# Patient Record
(demographics unavailable — no encounter records)

---

## 2025-07-23 NOTE — PHYSICAL EXAM

## 2025-07-23 NOTE — PHYSICAL EXAM

## 2025-07-23 NOTE — REVIEW OF SYSTEMS
[Joint Pain] : joint pain [Negative] : Heme/Lymph [FreeTextEntry9] : left groin pain with certain mvmts radiating to knee- see hPI- better with PT

## 2025-07-23 NOTE — PHYSICAL EXAM

## 2025-07-23 NOTE — HISTORY OF PRESENT ILLNESS
[de-identified] : Lat seen Preop in Jan- had Left Hip replacement- all went well and very happy with result- no pain and good mobility- had f/u and PT  Patient has been generally well . Adherent with medications as noted without side effects. Appetite good and weight reportedly stable. Active with good exercise tolerance. No sx of chest pain, sob, palpitations, orthopnea, PND, JALLOH, edema, lightheadedness..

## 2025-07-23 NOTE — HEALTH RISK ASSESSMENT
[Monthly or less (1 pt)] : Monthly or less (1 point) [1 or 2 (0 pts)] : 1 or 2 (0 points) [Never (0 pts)] : Never (0 points) [No] : In the past 12 months have you used drugs other than those required for medical reasons? No [No falls in past year] : Patient reported no falls in the past year [0] : 2) Feeling down, depressed, or hopeless: Not at all (0) [PHQ-2 Negative - No further assessment needed] : PHQ-2 Negative - No further assessment needed [Never] : Never [Excellent] : ~his/her~ current health as excellent [Very Good] : ~his/her~  mood as very good [Yes] : takes [None] : Patient does not have any barriers to medication adherence [Benzodiazepines] : benzodiazepines [Opioids] : opioids [Blood Thinners] : blood thinners [Muscle Relaxants] : muscle relaxants [Sedatives] : sedatives [NO] : No [Alone] : lives alone [Significant Other] : lives with significant other [Sexually Active] : sexually active [Feels Safe at Home] : Feels safe at home [Fully functional (bathing, dressing, toileting, transferring, walking, feeding)] : Fully functional (bathing, dressing, toileting, transferring, walking, feeding) [Fully functional (using the telephone, shopping, preparing meals, housekeeping, doing laundry, using] : Fully functional and needs no help or supervision to perform IADLs (using the telephone, shopping, preparing meals, housekeeping, doing laundry, using transportation, managing medications and managing finances) [Reports normal functional visual acuity (ie: able to read med bottle)] : Reports normal functional visual acuity [Reports changes in dental health] : Reports changes in dental health [Smoke Detector] : smoke detector [Seat Belt] :  uses seat belt [With Patient/Caregiver] : , with patient/caregiver [Designated Healthcare Proxy] : Designated healthcare proxy [Name: ___] : Health Care Proxy's Name: [unfilled]  [I will adhere to the patient's wishes.] : I will adhere to the patient's wishes. [FreeTextEntry1] : None [de-identified] : HOsp in Jan for left total hip  [de-identified] : PT; Ortho; Optho [Audit-CScore] : 1 [de-identified] : walking- b swimming at gym [de-identified] : eats everything- a lot of fruits and veggies- low fat- low calorie [de-identified] : Last fall 9/18 [[facility]es[facility]] : 8 [[id number]] : 0 [Change in mental status noted] : No change in mental status noted [Language] : denies difficulty with language [Behavior] : denies difficulty with behavior [Learning/Retaining New Information] : denies difficulty learning/retaining new information [Handling Complex Tasks] : denies difficulty handling complex tasks [Spatial Ability and Orientation] : denies difficulty with spatial ability and orientation [High Risk Behavior] : no high risk behavior [Reports changes in hearing] : Reports no changes in hearing [Reports changes in vision] : Reports no changes in vision [FreeTextEntry3] : Boyfriend for 8-9 years [de-identified] : [facility] doctor yearly- Dr Servin [de-identified] : NO issues driving [AdvancecareDate] : 07/25

## 2025-07-23 NOTE — HEALTH RISK ASSESSMENT
[Monthly or less (1 pt)] : Monthly or less (1 point) [1 or 2 (0 pts)] : 1 or 2 (0 points) [Never (0 pts)] : Never (0 points) [No] : In the past 12 months have you used drugs other than those required for medical reasons? No [No falls in past year] : Patient reported no falls in the past year [0] : 2) Feeling down, depressed, or hopeless: Not at all (0) [PHQ-2 Negative - No further assessment needed] : PHQ-2 Negative - No further assessment needed [Never] : Never [Excellent] : ~his/her~ current health as excellent [Very Good] : ~his/her~  mood as very good [Yes] : takes [None] : Patient does not have any barriers to medication adherence [Benzodiazepines] : benzodiazepines [Opioids] : opioids [Blood Thinners] : blood thinners [Muscle Relaxants] : muscle relaxants [Sedatives] : sedatives [NO] : No [Alone] : lives alone [Significant Other] : lives with significant other [Sexually Active] : sexually active [Feels Safe at Home] : Feels safe at home [Fully functional (bathing, dressing, toileting, transferring, walking, feeding)] : Fully functional (bathing, dressing, toileting, transferring, walking, feeding) [Fully functional (using the telephone, shopping, preparing meals, housekeeping, doing laundry, using] : Fully functional and needs no help or supervision to perform IADLs (using the telephone, shopping, preparing meals, housekeeping, doing laundry, using transportation, managing medications and managing finances) [Reports normal functional visual acuity (ie: able to read med bottle)] : Reports normal functional visual acuity [Reports changes in dental health] : Reports changes in dental health [Smoke Detector] : smoke detector [Seat Belt] :  uses seat belt [With Patient/Caregiver] : , with patient/caregiver [Designated Healthcare Proxy] : Designated healthcare proxy [Name: ___] : Health Care Proxy's Name: [unfilled]  [I will adhere to the patient's wishes.] : I will adhere to the patient's wishes. [FreeTextEntry1] : None [de-identified] : HOsp in Jan for left total hip  [de-identified] : PT; Ortho; Optho [Audit-CScore] : 1 [de-identified] : walking- b swimming at gym [de-identified] : eats everything- a lot of fruits and veggies- low fat- low calorie [de-identified] : [facility]18 [facility] [[id number]] : 0 [Change in mental status noted] : No change in mental status noted [Language] : denies difficulty with language [Behavior] : denies difficulty with behavior [Learning/Retaining New Information] : denies difficulty learning/retaining new information [Handling Complex Tasks] : denies difficulty handling complex tasks [Spatial Ability and Orientation] : denies difficulty with spatial ability and orientation [High Risk Behavior] : no high risk behavior [Reports changes in hearing] : Reports no changes in hearing [Reports changes in vision] : Reports no changes in vision [FreeTextEntry3] : Boyfriend for 8-9 years [de-identified] : E[place] doctor yearly- Dr Servin [de-identified] : NO issues driving [AdvancecareDate] : 07/25

## 2025-07-23 NOTE — ASSESSMENT
[Vaccines Reviewed] : Immunizations reviewed today. Please see immunization details in the vaccine log within the immunization flowsheet.  [FreeTextEntry1] : 1. HBP- goal less than 130/80. NO TOD and on ACE and diuretic BP at goal on current regimen Continue losartan 100 mg and HCTZ 25 mg daily  2. Hyperlipidemia/ High risk 10 yr CV risk- Discussed situation and options- asx Statin intensified last- takes adherently- continue atorvastatin 80md daily- check chol  3. h/o IFG- has lost and maintained wt from 210 lb starting point. Lost and maintained 20 lbs and exercises vigorously- last glucose/HbA1 normal-. No sx of DM CHek A1c and glu  4. h/o breast cancer- dx'd 10/01- s/p lumpectomy/RT and 5 years arimidex- completed 2/06. Mammo April 2019- left breast aspirate for a cyst- last mammo 8/22 with abnormality- stereotactic bs left- intraductal papilloma- advised lumpectomy- discussed with surgeon and intercurrent left mammo and targeted sono 2/23- ok- f/u 6 mos- 9/23 negative- F/U mammo and sono ilast Sept 2024- neg BIRADS 2- order placed  5. HCN- immun- reviewed- advised RSV  colon 7/14 DR Ibanez- 7/16- hyperplastic - August 2019- tubular adenoma and fragment of another- advised 1 yr f/u-done Oct 2020- and again 10/23- several tiny polyps- ? repeat given aged 80  DEXA- s/p 5 years arimidex- last 2/11 ok without change 2008- ? repeat again at some point  6. h/o Obesity- Maintained 20+ lb wt loss from high 210 lbs- lost additional weight since last- now at 179 by intention!-ENCOURAGED - lowest weight 174  7. Stage 0 CLL- elevated WBC since at least 2011 - last ok- repeat last 2/24- check today  8. Renal Insufficiency- significant increase in creatinine from baseline on routine labs 6/22- last 1.19 11/22- continue to monitor- neg proteinuria 2/24- last creat was 1.44- previous one was 1.19- understands not to take any meds without checking with me- repeat labs today  10. Left Hip OA- saw ortho- PT doing better  f/u 4 mos- or prn- call with labs- will make a mammo apt RSV at pharmacy  7/23- Called pt and reviewed labs- main concern is increased creatinine- in pattern of dehydratoin- weather is hot, etc- discussed- only drinks about 12 ounces water /day- advised needs to have 5-6 12 oz bottles/day and will repeat labs.Chol higher than ideal- ? adding another medication

## 2025-07-23 NOTE — HISTORY OF PRESENT ILLNESS
[de-identified] : Lat seen Preop in Jan- had Left Hip replacement- all went well and very happy with result- no pain and good mobility- had f/u and PT  Patient has been generally well . Adherent with medications as noted without side effects. Appetite good and weight reportedly stable. Active with good exercise tolerance. No sx of chest pain, sob, palpitations, orthopnea, PND, JALLOH, edema, lightheadedness..

## 2025-07-23 NOTE — HISTORY OF PRESENT ILLNESS
[de-identified] : Lat seen Preop in Jan- had Left Hip replacement- all went well and very happy with result- no pain and good mobility- had f/u and PT  Patient has been generally well . Adherent with medications as noted without side effects. Appetite good and weight reportedly stable. Active with good exercise tolerance. No sx of chest pain, sob, palpitations, orthopnea, PND, JALLOH, edema, lightheadedness..

## 2025-07-23 NOTE — HEALTH RISK ASSESSMENT
[Monthly or less (1 pt)] : Monthly or less (1 point) [1 or 2 (0 pts)] : 1 or 2 (0 points) [Never (0 pts)] : Never (0 points) [No] : In the past 12 months have you used drugs other than those required for medical reasons? No [No falls in past year] : Patient reported no falls in the past year [0] : 2) Feeling down, depressed, or hopeless: Not at all (0) [PHQ-2 Negative - No further assessment needed] : PHQ-2 Negative - No further assessment needed [Never] : Never [Excellent] : ~his/her~ current health as excellent [Very Good] : ~his/her~  mood as very good [Yes] : takes [None] : Patient does not have any barriers to medication adherence [Benzodiazepines] : benzodiazepines [Opioids] : opioids [Blood Thinners] : blood thinners [Muscle Relaxants] : muscle relaxants [Sedatives] : sedatives [NO] : No [Alone] : lives alone [Significant Other] : lives with significant other [Sexually Active] : sexually active [Feels Safe at Home] : Feels safe at home [Fully functional (bathing, dressing, toileting, transferring, walking, feeding)] : Fully functional (bathing, dressing, toileting, transferring, walking, feeding) [Fully functional (using the telephone, shopping, preparing meals, housekeeping, doing laundry, using] : Fully functional and needs no help or supervision to perform IADLs (using the telephone, shopping, preparing meals, housekeeping, doing laundry, using transportation, managing medications and managing finances) [Reports normal functional visual acuity (ie: able to read med bottle)] : Reports normal functional visual acuity [Reports changes in dental health] : Reports changes in dental health [Smoke Detector] : smoke detector [Seat Belt] :  uses seat belt [With Patient/Caregiver] : , with patient/caregiver [Designated Healthcare Proxy] : Designated healthcare proxy [Name: ___] : Health Care Proxy's Name: [unfilled]  [I will adhere to the patient's wishes.] : I will adhere to the patient's wishes. [FreeTextEntry1] : None [de-identified] : HOsp in Jan for left total hip  [de-identified] : PT; Ortho; Optho [Audit-CScore] : 1 [de-identified] : walking- b swimming at gym [de-identified] : eats everything- a lot of fruits and veggies- low fat- low calorie [de-identified] : [facility] fall 9/18 [facility] [[id number]] : 0 [Change in mental status noted] : No change in mental status noted [Language] : denies difficulty with language [Behavior] : denies difficulty with behavior [Learning/Retaining New Information] : denies difficulty learning/retaining new information [Handling Complex Tasks] : denies difficulty handling complex tasks [Spatial Ability and Orientation] : denies difficulty with spatial ability and orientation [High Risk Behavior] : no high risk behavior [Reports changes in hearing] : Reports no changes in hearing [Reports changes in vision] : Reports no changes in vision [FreeTextEntry3] : Boyfriend for 8-9 years [de-identified] : E[place] doctor yearly- Dr Servin [de-identified] : NO issues driving [AdvancecareDate] : 07/25